# Patient Record
Sex: MALE | Race: WHITE
[De-identification: names, ages, dates, MRNs, and addresses within clinical notes are randomized per-mention and may not be internally consistent; named-entity substitution may affect disease eponyms.]

---

## 2019-11-12 ENCOUNTER — HOSPITAL ENCOUNTER (OUTPATIENT)
Dept: HOSPITAL 39 - MRI | Age: 54
End: 2019-11-12
Payer: COMMERCIAL

## 2019-11-12 DIAGNOSIS — M25.469: Primary | ICD-10-CM

## 2019-11-12 DIAGNOSIS — M94.261: ICD-10-CM

## 2019-11-12 DIAGNOSIS — M22.8X1: ICD-10-CM

## 2019-11-12 DIAGNOSIS — M94.262: ICD-10-CM

## 2019-11-12 DIAGNOSIS — M23.8X2: ICD-10-CM

## 2019-11-12 DIAGNOSIS — S83.411A: ICD-10-CM

## 2019-11-12 DIAGNOSIS — Z98.890: ICD-10-CM

## 2019-11-12 NOTE — MRI
Study: MRI of the Left Knee. 



Indication: LEFT KNEE PAIN  



Technique: Multiplanar, multi sequence MRI of the left knee was

obtained without intravenous contrast. 



Comparison: April 7, 2016.



Findings:



Interval placement of an ACL graft. Moderate mucoid degeneration

of the mid to distal aspect of the graft with low-grade

interstitial tearing distally. No complete transection. Mild

intraosseous cystic change of the tibial tunnel noted. PCL and

lateral collateral ligament complex intact.



Prior MCL repair noted with suture anchors proximally and

distally. The MCL is thickened and bowed throughout. A ganglion

tracks from the femoral suture tract through a small defect

within the proximal MCL and medial retinaculum into the

subcutaneous fat. The subcutaneous portion of the ganglion

measures up to 30 mm AP by 10 mm transverse by 57 mm

craniocaudal. No recurrent tear of the MCL identified.



Degenerative signal posterior horn and body medial meniscus

without tear. Body extruded by 2 mm. Grade 4 chondrosis and

subchondral cystic change anteromedial margin of the medial

tibial plateau with additional grade 2 and 3 chondral loss

throughout the medial compartment.



Mild volume loss and degenerative signal posterior horn and body

lateral meniscus without fluid-filled tear. Grade 2 and mild

grade 3 chondral loss of the lateral compartment.



Tendinosis and mild interstitial fissuring patellar tendon

origin. Quadriceps tendon intact. Patella located. Patchy grade 3

and mild grade 4 chondral loss medial patellar facet/apex as well

as at the midline femoral trochlea.



Moderate size knee effusion with scattered synovitis. No acute

fracture.



Impression:



Moderate mucoid degeneration ACL graft with low-grade

interstitial tearing distally as well as mild cystic change about

the tibial tunnel.



Prior MCL repair with associated prominent ganglion within the

subcutaneous fat superficial to medial femoral condyle which

communicates with the suture anchor within the femur as above. No

recurrent full-thickness tear of the MCL.



Degenerative changes medial meniscus and lateral meniscus without

fluid-filled tear.



Grade 2-3 chondrosis throughout the medial compartment but with a

dominant area of grade 4 chondral loss and subchondral cystic

change of the anteromedial margin of the medial tibial plateau.



Grade 2 and mild grade 3 chondrosis throughout the lateral

compartment.



Patchy grade 3/4 chondrosis medial patellar facet and apex as

well as at the midline femoral trochlea.



Moderate size knee effusion.



Additional findings as above.



Electronically signed by:  Neto Gibbs MD  11/12/2019 9:18 AM

[facility] Workstation: [id number]

## 2019-11-12 NOTE — MRI
Study: MRI of the Right Knee. 



Indication: RIGHT KNEE PAIN  



Technique: Multiplanar, multi sequence MRI of the right knee was

obtained without intravenous contrast. 



Comparison: None.



Findings:



Mild mucoid degeneration of the distal aspect of the ACL graft

with low-grade interstitial tearing at this site. No recurrent

full-thickness tear. PCL intact. MCL is lax and bowed indicating

sequela of a prior MCL sprain. No acute tear.



 IT band thickened distally with increased internal PD signal

which can be seen with IT band friction. Tiny 7 mm ganglion

associated with the anteromedial margin of the distal IT band.

Remaining lateral collateral ligament complex structures intact.



Scattered degenerative signal change medial meniscus with

vertically oriented increased PD signal at the peripheral red

zone of the posterior horn. This could reflect a vertical radial

tear versus postsurgical change.



Scattered degenerative signal changes lateral meniscus with free

edge and undersurface fraying of the body.



Areas of grade 2 and mild grade 3 chondral thinning and surface

irregularity of the medial and lateral knee compartments.



Tendinosis quadriceps tendon insertion. Post surgical changes

patellar tendon without tear. Patella normally located. Extensive

grade 4 chondral loss, cortical remodeling and subchondral marrow

change throughout the majority of the patella with grade 3/4

changes throughout the femoral trochlea.



Moderate size knee effusion. Mild thickening medial patellar

plica. No acute fracture.



Impression:



Mild mucoid degeneration ACL graft with mild interstitial tearing

distally. No transection.



Remote MCL sprain.



Degenerative change medial meniscus and lateral meniscus with

questionable vertical radial tear versus post surgical signal

change in the peripheral red zone of the posterior horn medial

meniscus.



Grade 2-3 chondrosis throughout the medial compartment and

lateral compartment with more pronounced extensive grade 3-4

chondral loss of the patellofemoral compartment.



Moderate size knee effusion.



Additional findings as above.



Electronically signed by:  Neto Gibbs MD  11/12/2019 9:22 AM

[facility] Workstation: [id number]

## 2019-12-02 ENCOUNTER — HOSPITAL ENCOUNTER (OUTPATIENT)
Dept: HOSPITAL 39 - RAD | Age: 54
End: 2019-12-02
Attending: ORTHOPAEDIC SURGERY
Payer: COMMERCIAL

## 2019-12-02 DIAGNOSIS — M25.562: ICD-10-CM

## 2019-12-02 DIAGNOSIS — M16.12: Primary | ICD-10-CM

## 2019-12-02 NOTE — RAD
EXAM DESCRIPTION: Pelvis



CLINICAL HISTORY: 54 years Male, PAIN IN LEFT HIP



COMPARISON: None.



Findings: One view/radiograph



Moderate left and mild right hip osteoarthritis. Chronic

deformity about the right pubic symphysis. No acute fracture or

dislocation identified. Soft tissues are unremarkable sacroiliac

joint spaces are preserved. Normal bone mineralization.



IMPRESSION:

Moderate left hip osteoarthritis.



Electronically signed by:  Sebastian Mosquera MD  12/2/2019 9:18 AM

[facility] Workstation: [id number]

## 2019-12-02 NOTE — RAD
EXAM DESCRIPTION: Knee,Left Complete



CLINICAL HISTORY: 54 years Male, PAIN IN LEFT KNEE



COMPARISON: None.



Findings: Four views/radiographs



ACL reconstruction. Mild medial compartment narrowing. Small

scattered osteophytes. No joint effusion. No acute fracture or

dislocation.



IMPRESSION:

No evidence of acute process in the left knee.



Electronically signed by:  Sebastian Mosquera MD  12/2/2019 9:17 AM

[facility] Workstation: [id number]